# Patient Record
Sex: MALE | Race: ASIAN | NOT HISPANIC OR LATINO | URBAN - METROPOLITAN AREA
[De-identification: names, ages, dates, MRNs, and addresses within clinical notes are randomized per-mention and may not be internally consistent; named-entity substitution may affect disease eponyms.]

---

## 2023-05-23 ENCOUNTER — EMERGENCY (EMERGENCY)
Facility: HOSPITAL | Age: 43
LOS: 1 days | Discharge: HOME | End: 2023-05-23
Attending: STUDENT IN AN ORGANIZED HEALTH CARE EDUCATION/TRAINING PROGRAM | Admitting: STUDENT IN AN ORGANIZED HEALTH CARE EDUCATION/TRAINING PROGRAM
Payer: COMMERCIAL

## 2023-05-23 VITALS
HEART RATE: 101 BPM | TEMPERATURE: 98 F | OXYGEN SATURATION: 98 % | RESPIRATION RATE: 18 BRPM | DIASTOLIC BLOOD PRESSURE: 81 MMHG | SYSTOLIC BLOOD PRESSURE: 135 MMHG

## 2023-05-23 VITALS
RESPIRATION RATE: 18 BRPM | DIASTOLIC BLOOD PRESSURE: 79 MMHG | TEMPERATURE: 98 F | HEART RATE: 95 BPM | SYSTOLIC BLOOD PRESSURE: 111 MMHG | OXYGEN SATURATION: 98 %

## 2023-05-23 DIAGNOSIS — R53.83 OTHER FATIGUE: ICD-10-CM

## 2023-05-23 DIAGNOSIS — J10.1 INFLUENZA DUE TO OTHER IDENTIFIED INFLUENZA VIRUS WITH OTHER RESPIRATORY MANIFESTATIONS: ICD-10-CM

## 2023-05-23 DIAGNOSIS — Z91.018 ALLERGY TO OTHER FOODS: ICD-10-CM
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
## 2023-05-23 DIAGNOSIS — R51.9 HEADACHE, UNSPECIFIED: ICD-10-CM

## 2023-05-23 DIAGNOSIS — M79.10 MYALGIA, UNSPECIFIED SITE: ICD-10-CM

## 2023-05-23 DIAGNOSIS — R10.31 RIGHT LOWER QUADRANT PAIN: ICD-10-CM
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
## 2023-05-23 LAB
ANION GAP SERPL CALC-SCNC: 8 MMOL/L — SIGNIFICANT CHANGE UP (ref 5–17)
BASOPHILS # BLD AUTO: 0 K/UL — SIGNIFICANT CHANGE UP (ref 0–0.2)
BASOPHILS NFR BLD AUTO: 0 % — SIGNIFICANT CHANGE UP (ref 0–2)
BUN SERPL-MCNC: 7 MG/DL — SIGNIFICANT CHANGE UP (ref 7–23)
BURR CELLS BLD QL SMEAR: PRESENT — SIGNIFICANT CHANGE UP
CALCIUM SERPL-MCNC: 8.6 MG/DL — SIGNIFICANT CHANGE UP (ref 8.4–10.5)
CHLORIDE SERPL-SCNC: 102 MMOL/L — SIGNIFICANT CHANGE UP (ref 96–108)
CO2 SERPL-SCNC: 28 MMOL/L — SIGNIFICANT CHANGE UP (ref 22–31)
CREAT SERPL-MCNC: 0.88 MG/DL — SIGNIFICANT CHANGE UP (ref 0.5–1.3)
EGFR: 111 ML/MIN/1.73M2 — SIGNIFICANT CHANGE UP
EOSINOPHIL # BLD AUTO: 0.03 K/UL — SIGNIFICANT CHANGE UP (ref 0–0.5)
EOSINOPHIL NFR BLD AUTO: 0.9 % — SIGNIFICANT CHANGE UP (ref 0–6)
FLUBV RNA SPEC QL NAA+PROBE: DETECTED
GLUCOSE SERPL-MCNC: 117 MG/DL — HIGH (ref 70–99)
HCT VFR BLD CALC: 46 % — SIGNIFICANT CHANGE UP (ref 39–50)
HGB BLD-MCNC: 14.9 G/DL — SIGNIFICANT CHANGE UP (ref 13–17)
LYMPHOCYTES # BLD AUTO: 1.03 K/UL — SIGNIFICANT CHANGE UP (ref 1–3.3)
LYMPHOCYTES # BLD AUTO: 29.8 % — SIGNIFICANT CHANGE UP (ref 13–44)
MANUAL SMEAR VERIFICATION: SIGNIFICANT CHANGE UP
MCHC RBC-ENTMCNC: 28.8 PG — SIGNIFICANT CHANGE UP (ref 27–34)
MCHC RBC-ENTMCNC: 32.4 GM/DL — SIGNIFICANT CHANGE UP (ref 32–36)
MCV RBC AUTO: 89 FL — SIGNIFICANT CHANGE UP (ref 80–100)
MONOCYTES # BLD AUTO: 0.36 K/UL — SIGNIFICANT CHANGE UP (ref 0–0.9)
MONOCYTES NFR BLD AUTO: 10.5 % — SIGNIFICANT CHANGE UP (ref 2–14)
NEUTROPHILS # BLD AUTO: 2.04 K/UL — SIGNIFICANT CHANGE UP (ref 1.8–7.4)
NEUTROPHILS NFR BLD AUTO: 52.6 % — SIGNIFICANT CHANGE UP (ref 43–77)
NEUTS BAND # BLD: 6.2 % — SIGNIFICANT CHANGE UP (ref 0–8)
OVALOCYTES BLD QL SMEAR: SLIGHT — SIGNIFICANT CHANGE UP
PLAT MORPH BLD: NORMAL — SIGNIFICANT CHANGE UP
PLATELET # BLD AUTO: 209 K/UL — SIGNIFICANT CHANGE UP (ref 150–400)
POIKILOCYTOSIS BLD QL AUTO: SLIGHT — SIGNIFICANT CHANGE UP
POTASSIUM SERPL-MCNC: 4 MMOL/L — SIGNIFICANT CHANGE UP (ref 3.5–5.3)
POTASSIUM SERPL-SCNC: 4 MMOL/L — SIGNIFICANT CHANGE UP (ref 3.5–5.3)
RAPID RVP RESULT: DETECTED
RBC # BLD: 5.17 M/UL — SIGNIFICANT CHANGE UP (ref 4.2–5.8)
RBC # FLD: 12.3 % — SIGNIFICANT CHANGE UP (ref 10.3–14.5)
RBC BLD AUTO: ABNORMAL
RVP NOTIFY: SIGNIFICANT CHANGE UP
SARS-COV-2 RNA SPEC QL NAA+PROBE: SIGNIFICANT CHANGE UP
SMUDGE CELLS # BLD: PRESENT — SIGNIFICANT CHANGE UP
SODIUM SERPL-SCNC: 138 MMOL/L — SIGNIFICANT CHANGE UP (ref 135–145)
WBC # BLD: 3.47 K/UL — LOW (ref 3.8–10.5)
WBC # FLD AUTO: 3.47 K/UL — LOW (ref 3.8–10.5)

## 2023-05-23 PROCEDURE — 99284 EMERGENCY DEPT VISIT MOD MDM: CPT

## 2023-05-23 PROCEDURE — 96375 TX/PRO/DX INJ NEW DRUG ADDON: CPT

## 2023-05-23 PROCEDURE — 85025 COMPLETE CBC W/AUTO DIFF WBC: CPT

## 2023-05-23 PROCEDURE — 36415 COLL VENOUS BLD VENIPUNCTURE: CPT

## 2023-05-23 PROCEDURE — 0225U NFCT DS DNA&RNA 21 SARSCOV2: CPT

## 2023-05-23 PROCEDURE — 96374 THER/PROPH/DIAG INJ IV PUSH: CPT

## 2023-05-23 PROCEDURE — 80048 BASIC METABOLIC PNL TOTAL CA: CPT

## 2023-05-23 PROCEDURE — 99284 EMERGENCY DEPT VISIT MOD MDM: CPT | Mod: 25

## 2023-05-23 RX ORDER — KETOROLAC TROMETHAMINE 30 MG/ML
15 SYRINGE (ML) INJECTION ONCE
Refills: 0 | Status: DISCONTINUED | OUTPATIENT
Start: 2023-05-23 | End: 2023-05-23

## 2023-05-23 RX ORDER — SODIUM CHLORIDE 9 MG/ML
1000 INJECTION INTRAMUSCULAR; INTRAVENOUS; SUBCUTANEOUS ONCE
Refills: 0 | Status: COMPLETED | OUTPATIENT
Start: 2023-05-23 | End: 2023-05-23

## 2023-05-23 RX ORDER — METOCLOPRAMIDE HCL 10 MG
10 TABLET ORAL ONCE
Refills: 0 | Status: COMPLETED | OUTPATIENT
Start: 2023-05-23 | End: 2023-05-23

## 2023-05-23 RX ADMIN — Medication 104 MILLIGRAM(S): at 06:31

## 2023-05-23 RX ADMIN — Medication 15 MILLIGRAM(S): at 06:31

## 2023-05-23 RX ADMIN — SODIUM CHLORIDE 1000 MILLILITER(S): 9 INJECTION INTRAMUSCULAR; INTRAVENOUS; SUBCUTANEOUS at 06:31

## 2023-05-23 NOTE — ED PROVIDER NOTE - PHYSICAL EXAMINATION
Constitutional : Well appearing, non-toxic, no acute distress. awake, alert, oriented to person, place, time/situation.  Head : head normocephalic, atraumatic  EENMT : eyes clear bilaterally, PERRL, EOMI. airway patent. moist mucous membranes. neck supple. no meningismus.   oropharynx noninjected, no exudates, uvula midline w/o edema.   Cardiac : Normal rate, regular rhythm. No murmur appreciated, no LE edema.  Resp : Breath sounds clear and equal bilaterally. Respirations even and unlabored.   Gastro : abdomen soft, nontender, nondistended. no rebound or guarding. no CVAT.  MSK :  range of motion is not limited, no muscle or joint tenderness  Vasc : Extremities warm and well perfused. 2+ radial and DP pulses. cap refill <2 seconds  Neuro : Alert and oriented, CNII-XII grossly intact, no focal deficits, no motor or sensory deficits.  Skin : Skin normal color for race, warm, dry and intact. No evidence of rash.  Psych : Alert and oriented to person, place, time/situation. normal mood and affect. no apparent risk to self or others.

## 2023-05-23 NOTE — ED PROVIDER NOTE - CLINICAL SUMMARY MEDICAL DECISION MAKING FREE TEXT BOX
denies medical history, here w 3-4 days of fatigue, body aches, headaches, congestion. wife w similar symptoms. here bc of ongoing headaches.   pt well appearing, stable vitals, exam reassuring - oropharynx clear, lungs clear, neuro intact.   suspect viral illness  headache likely related to viral illness. no red flag symptoms. do not feel need for head imaging.   no fevers, no neck pain. no meningismus. do not suspect meningitis.   also wife concern for hernia after heavy lifting today - benign abdomen. pt admits no pain since weekend.   plan - labs, viral swab  analgesia / iv hydration  reassess

## 2023-05-23 NOTE — ED PROVIDER NOTE - PATIENT PORTAL LINK FT
You can access the FollowMyHealth Patient Portal offered by [facility] by registering at the following website: [email or web address]. By joining [facility]’s FollowMyHealth portal, you will also be able to view your health information using other applications (apps) compatible with our system.

## 2023-05-23 NOTE — ED PROVIDER NOTE - OBJECTIVE STATEMENT
43 yr old male, denies medical history, presents to the Emergency Department w uri symptoms. pt w 3-4 days of fatigue, body aches, headaches, congestion. home covid test negative. taking tylenol w minimal relief. here bc headache continued today. generalized headache, has fluctuated in intensity since onset, gradual onset. not worse of life. wife sick w similar symptoms.   no vision changes, neck pain / stiffness, dizziness, n/v, extremity weakness / numbness / tingling. no cp, sob, cough.  also notes that after heavy lifting this weekend felt pain in right inguinal region, felt like he may have a hernia - has not had pain over the last few days.

## 2023-05-23 NOTE — ED PROVIDER NOTE - NS ED ATTENDING STATEMENT MOD
This was a shared visit with the BLANE. I reviewed and verified the documentation and independently performed the documented:

## 2023-05-23 NOTE — ED PROVIDER NOTE - PROGRESS NOTE DETAILS
labs unremarkable  feeling better after meds. headache resolved.   viral pending but will f/u.     All results reviewed with the patient verbally. Discharge plan and return precautions d/w pt who verbalized understanding and agrees with plan. All questions answered. Vitals WNL. Ready for d/c.

## 2023-05-23 NOTE — ED ADULT NURSE NOTE - NSFALLUNIVINTERV_ED_ALL_ED
Bed/Stretcher in lowest position, wheels locked, appropriate side rails in place/Call bell, personal items and telephone in reach/Instruct patient to call for assistance before getting out of bed/chair/stretcher/Non-slip footwear applied when patient is off stretcher/[facility] to call system/Physically safe environment - no spills, clutter or unnecessary equipment/Purposeful proactive rounding/Room/bathroom lighting operational, light cord in reach

## 2023-05-24 NOTE — ED POST DISCHARGE NOTE - DETAILS
pt called, message left, placed for further f/u attempts pt feeling improved, discussed symptomatic management
